# Patient Record
Sex: FEMALE | NOT HISPANIC OR LATINO
[De-identification: names, ages, dates, MRNs, and addresses within clinical notes are randomized per-mention and may not be internally consistent; named-entity substitution may affect disease eponyms.]

---

## 2019-02-24 ENCOUNTER — RESULT REVIEW (OUTPATIENT)
Age: 74
End: 2019-02-24

## 2020-02-02 ENCOUNTER — RESULT REVIEW (OUTPATIENT)
Age: 75
End: 2020-02-02

## 2023-06-28 ENCOUNTER — EMERGENCY (EMERGENCY)
Facility: HOSPITAL | Age: 78
LOS: 1 days | Discharge: HOME | End: 2023-06-28
Attending: STUDENT IN AN ORGANIZED HEALTH CARE EDUCATION/TRAINING PROGRAM | Admitting: STUDENT IN AN ORGANIZED HEALTH CARE EDUCATION/TRAINING PROGRAM
Payer: MEDICARE

## 2023-06-28 VITALS
TEMPERATURE: 98 F | DIASTOLIC BLOOD PRESSURE: 82 MMHG | OXYGEN SATURATION: 97 % | HEIGHT: 65 IN | SYSTOLIC BLOOD PRESSURE: 152 MMHG | HEART RATE: 57 BPM | WEIGHT: 121.92 LBS | RESPIRATION RATE: 17 BRPM

## 2023-06-28 PROCEDURE — 99283 EMERGENCY DEPT VISIT LOW MDM: CPT

## 2023-06-28 PROCEDURE — 99282 EMERGENCY DEPT VISIT SF MDM: CPT

## 2023-06-28 NOTE — ED PROVIDER NOTE - PHYSICAL EXAMINATION
VITAL SIGNS: I have reviewed nursing notes and confirm.  CONSTITUTIONAL: Well appearing, in no acute distress.   SKIN:  warm and dry, no acute rash.   HEAD:  normocephalic, atraumatic.  EYES: EOM intact; conjunctiva and sclera clear. 20/25 OU  ENT: No nasal discharge; airway clear.   NECK: Supple; non tender.  CARD: S1, S2 normal; no murmurs, gallops, or rubs. Regular rate and rhythm.   RESP:  Clear to auscultation b/l, no wheezes, rales or rhonchi.  ABD: Normal bowel sounds; soft; non-distended; non-tender; no guarding/ rebound.  EXT: Normal ROM. No clubbing, cyanosis or edema. 2+ pulses to b/l ue/le.  NEURO: Alert, oriented, grossly unremarkable  PSYCH: Cooperative, mood and affect appropriate.

## 2023-06-28 NOTE — ED ADULT TRIAGE NOTE - CHIEF COMPLAINT QUOTE
Pt presents requesting evaluation of eyes post exposure to "bleach spray". Irrigated eyes after incident occurred at approx 7pm, denies any pain or visual disturbances. [facility] 20/25 O.U.

## 2023-06-28 NOTE — ED PROVIDER NOTE - CLINICAL SUMMARY MEDICAL DECISION MAKING FREE TEXT BOX
76 yo female with a hx of HLD, paroxysmal Afib on Eliquis presenting to the ED for an evaluation of her eyes after chemical exposure 4 hours ago. Asymptomatic. Already irrigated her eyes at home. Normal eye exam with intact visual acuity. No emergent intervention indicated. Discharged home with follow up and return precautions.

## 2023-06-28 NOTE — ED PROVIDER NOTE - PATIENT PORTAL LINK FT
You can access the FollowMyHealth Patient Portal offered by [facility] by registering at the following website: [email or web address]. By joining [facility]’s FollowMyHealth portal, you will also be able to view your health information using other applications (apps) compatible with our system.

## 2023-06-28 NOTE — ED PROVIDER NOTE - NSFOLLOWUPINSTRUCTIONS_ED_ALL_ED_FT
English    Chemical Burn of the Eyes, Adult  Chemicals, including many common household products, can burn a person's eyes. Burns usually happen when liquid chemical splashes into one or both eyes. They can also happen if chemical powder or fumes blow into the eyes. Chemicals can injure the eyes long after first making contact.    Chemical eye burns can range from mild to severe. The severity of the burn depends on the chemical, the amount that got on the eye, and how long it was on the eye. Severe eye burns usually involve strong acids or alkalies, such as the chemicals used in . Serious burns can damage or scar the eyelids and eyeballs. The damage can be permanent and lead to blindness.    What are the causes?  This condition may be caused by:  An injury involving a household product, such as a , detergent, or paint thinner.  A workplace injury, such as an explosion, spill, or fall.  An airbag going off in a car crash. Airbags can release a chemical powder.  An attack involving a chemical.  What increases the risk?  You may be at greater risk for this condition if you work with chemicals. Some people who regularly work with chemicals include plumbers, janitors, farmers,  workers, auto repair workers, and painters.    What are the signs or symptoms?  A normal eye compared to a reddened eye with a chemical burn.  Symptoms of this condition include:  Pain, stinging, or burning in the eye or eyes. This may range from mild to so severe that you do not want to open your eyes.  Eye redness and swelling.  Blurred vision.  Tearing or discharge from the eye or eyes.  Deep eye pain when you are exposed to light. The pain can develop hours or days after the injury occurred.  A hole (perforation) in the eyeball. This is rare.  A change in eyelid shape (deformity).  Blindness.  How is this diagnosed?  This condition is diagnosed based on:  Your symptoms and medical history. Your health care provider will ask you questions to assess how severe your chemical burn is, including:  What type of chemical came in contact with your eye.  How and when the contact happened.  What emergency treatment was performed, if any.  A physical exam. Your tears may be tested to check the level of acid or base. After your health care provider washes the chemical off your eye, he or she will examine the surface of your eye and eyelid.  An eye specialist (ophthalmologist) should also do an eye exam to determine how severe the injury is and decide what treatment you need. This eye exam should be done within 24 hours to prevent complications and vision loss.  How is this treated?  This condition is treated by flushing the chemical out of the eye (irrigating the eye) with water or saline solution, which is made of salt and water. Irrigation should be done even up to several hours after the injury occurred. A medicated eye drop may be used to ease eye pain while the chemical is rinsed off your eye.    After the eye has been cleared of the chemical, treatment may include:  Prescription or over-the-counter medicine to ease pain and inflammation.  Antibiotic drops or ointment applied directly to the eye to prevent or treat infection.  Corticosteroid eye drops or ointments to reduce redness and irritation.  Other eye drops, eye ointments, or medicines may be used for more severe chemical injuries.  Severe burns may also require:  A procedure to remove damaged tissue in or around the eye (debridement).  Placement of a bandage, soft contact lens, or amniotic membrane tissue on the surface of the eye to protect the eye and help it heal.  Surgery to:  Secure amniotic membrane tissue to the surface of the eye.  Reconstruct the eye and surrounding tissue.  Replace damaged parts of the eye with eye tissue from a donor.  Keep the eye partly closed to help with healing.  Follow these instructions at home:  Medicines    Washing hands with soap and water.  Take over-the-counter and prescription medicines only as told by your health care provider.  Take or apply your antibiotic medicine, ointment, or drops as told by your health care provider. Do not stop using the antibiotic even if you start to feel better.  Wash your hands with soap and water for at least 20 seconds before you apply eye medicines to your eyes. If soap and water are not available, use hand .  Driving    Ask your health care provider if the medicine prescribed to you requires you to avoid driving or using machinery.  Do not drive or use machinery if your vision is blurry.  General instructions    Do not take baths, swim, or use a hot tub until your health care provider approves. Ask your health care provider if you may take showers. You may only be allowed to take sponge baths.  Until the inflammation is gone or as long as directed by your health care provider:  Do not wear contact lenses. Wear glasses instead.  Do not wear eye makeup.  Do not touch or rub your eyes.  Return to your normal activities as told by your health care provider. Ask your health care provider what activities are safe for you.  Keep all follow-up visits. This is important.  How is this prevented?  Wear safety glasses or goggles, a face shield, or a full-face respirator when using potentially dangerous chemicals. This equipment should not block your vision. It should be in good condition, fit properly, and feel comfortable.  Do not try to use or work with unlabeled chemical containers.  Use household products only according to directions from the .  Use chemicals in well-ventilated areas with plenty of fresh air.  Do not mix chemicals unless directions on the label tell you to do that. Do not combine chemicals from leaking or damaged containers.  Get rid of chemicals safely.  Do not dump chemicals down drains, storm sewers, or toilets.  Do not try to burn household chemicals. Art containers clearly and set them aside in a well-ventilated area until they can be discarded properly.  Keep all chemicals out of reach of children and pets.  Contact a health care provider if:  Your symptoms do not improve or they get worse.  You have fluid, blood, or pus coming from the eye area.  Get help right away if:  You are in severe pain.  You have vision loss.  Summary  Chemical eye burns can range from mild to severe and can cause vision loss and blindness.  This condition is treated by flushing the chemical out of your eye (irrigating) with water or saline right away.  Take over-the-counter and prescription medicines only as told by your health care provider.  Wear safety glasses or goggles, a face shield, or a full-face respirator when using potentially dangerous chemicals.  This information is not intended to replace advice given to you by your health care provider. Make sure you discuss any questions you have with your health care provider.    Document Revised: 11/21/2022 Document Reviewed: 11/21/2022  Elsevier Patient Education © 2023 [employer]

## 2023-06-28 NOTE — ED PROVIDER NOTE - OBJECTIVE STATEMENT
78 yo female with a hx of HLD, paroxysmal Afib on Eliquis presenting to the ED for an evaluation of her eyes after chemical exposure 4 hours ago. She accidentally got vinger and bleach diluted solution in her eyes. Immediately irrigated her eyes after. No eye irritation now. No burning, redness, changes in vision, discharge, periorbital swelling, headache, n/v, dizziness. No other injuries. Wanted to come in for a check up.

## 2023-06-28 NOTE — ED ADULT NURSE NOTE - OBJECTIVE STATEMENT
Pt reports accidently spraying eyes with either vinegar and water or bleach and water. No loss of vision/blurry vision/eye redness/eye irritation. No skin trauma or irritation noted. Pt denies any injury to both eyes and denies eye pain on assessment. Pt reports that she irrigated both eyes after the exposure. Pt denies any other symptoms or complaints.

## 2023-06-28 NOTE — ED ADULT NURSE NOTE - HIV OFFER
Previously Declined (within the last year) Skin Substitute Text: The defect edges were debeveled with a #[id number] scalpel blade.  Given the location of the defect, shape of the defect and the proximity to free margins a skin substitute graft was deemed most appropriate.  The graft material was trimmed to fit the size of the defect. The graft was then placed in the primary defect and oriented appropriately.

## 2023-07-02 DIAGNOSIS — Z79.01 LONG TERM (CURRENT) USE OF ANTICOAGULANTS: ICD-10-CM

## 2023-07-02 DIAGNOSIS — I48.0 PAROXYSMAL ATRIAL FIBRILLATION: ICD-10-CM

## 2023-07-02 DIAGNOSIS — E78.5 HYPERLIPIDEMIA, UNSPECIFIED: ICD-10-CM

## 2023-07-02 DIAGNOSIS — Z00.00 ENCOUNTER FOR GENERAL ADULT MEDICAL EXAMINATION WITHOUT ABNORMAL FINDINGS: ICD-10-CM

## 2023-07-02 DIAGNOSIS — Z77.098 CONTACT WITH AND (SUSPECTED) EXPOSURE TO OTHER HAZARDOUS, CHIEFLY NONMEDICINAL, CHEMICALS: ICD-10-CM
